# Patient Record
Sex: MALE | Race: WHITE | Employment: OTHER | URBAN - METROPOLITAN AREA
[De-identification: names, ages, dates, MRNs, and addresses within clinical notes are randomized per-mention and may not be internally consistent; named-entity substitution may affect disease eponyms.]

---

## 2020-08-14 NOTE — PROGRESS NOTES
[address] [facility] Oncology at [address] [place] [address]  [phone]    Hematology / Oncology Consult    Reason for Visit:   Lev Gerard is a 81 y.o. male who is seen in consultation at the request of Dr. John Fan for evaluation of lymphoma. History of Present Illness:   Lev Gerard is a 81 y.o. male with AF, h/o CVA comes in for evaluation of abnormal lymphocytes seen in skin biopsy. Patient noted a rash on his left posterior calf and underwent a shave biopsy. He states he had a similar rash on the R calf a year ago treated with Doxycycline. No fevers, chills, unintentional weight loss, sweats, enlarged LNs, n/v/d. His mid back became somewhat more pruritic in the past few days as well. He had a recent ER visit for AF requiring cardioversion, is following with Cardiology closely. No h/o malignancy per patient. Past Medical History:   Diagnosis Date    Atrial fibrillation ([address]     Stroke [facility])       Past Surgical History:   Procedure Laterality Date    HX CERVICAL FUSION      HX HIP REPLACEMENT        Social History     Tobacco Use    Smoking status: Never Smoker    Smokeless tobacco: Never Used   Substance Use Topics    Alcohol use: Yes     Alcohol/week: 1.0 standard drinks     Types: 1 Cans of beer per week      Family History   Problem Relation Age of Onset    Cancer Brother         prostate     Current Outpatient Medications   Medication Sig    atenoloL (TENORMIN) 25 mg tablet TAKE 1 TABLET BY MOUTH EVERY DAY    diclofenac EC (VOLTAREN) 75 mg EC tablet TK 1 T PO  BID WITH FOOD OR MILK    dofetilide (TIKOSYN) 250 mcg capsule TAKE 1 CAPSULE BY MOUTH TWICE A DAY    Xarelto 20 mg tab tablet TK 1 T PO QD    simvastatin (ZOCOR) 40 mg tablet TK 1 T PO QD IN THE DANIEL     No current facility-administered medications for this visit. No Known Allergies     Review of Systems: A complete review of systems was obtained, negative except as described above.     Physical Exam: Visit Vitals  /82 (BP 1 Location: Left arm, BP Patient Position: Sitting)   Pulse 64   Temp 97.8 °F (36.6 °C) (Temporal)   Ht 6' 2\" (1.88 m)   Wt 288 lb 3.2 oz (130.7 kg)   SpO2 94%   BMI 37.00 kg/m²     ECOG PS: 0  General: Well developed, no acute distress  Eyes: PERRLA, EOMI, anicteric sclerae  HENT: Atraumatic, OP clear, TMs intact without erythema  Neck: Supple  Lymphatic: No cervical, supraclavicular, axillary or inguinal adenopathy  Respiratory: CTAB, normal respiratory effort  CV: Normal rate, regular rhythm, no murmurs, no peripheral edema  GI: Soft, nontender, nondistended, no masses, no hepatomegaly, no splenomegaly  MS: Normal gait and station. Digits without clubbing or cyanosis. Skin: No ecchymoses, or petechiae. Normal temperature, turgor, and texture. Left posterior inferior calf with scab and surrounding mild erythema. Mild erythema in mid back. Neuro/Psych: Alert, oriented. 5/5 strength in all 4 extremities. Appropriate affect, normal judgment/insight. Results:     Lab Results   Component Value Date/Time    WBC 8.0 03/31/2009 05:55 AM    HGB 12.6 03/31/2009 05:55 AM    HCT 39.4 03/31/2009 05:55 AM    PLATELET 372 92/86/7298 05:55 AM    MCV 92.7 03/31/2009 05:55 AM    ABS. NEUTROPHILS 4.6 03/31/2009 05:55 AM     Lab Results   Component Value Date/Time    Sodium 136 03/29/2009 06:20 PM    Potassium 4.1 03/29/2009 06:20 PM    Chloride 101 03/29/2009 06:20 PM    CO2 29 03/29/2009 06:20 PM    Glucose 106 03/29/2009 06:20 PM    BUN 12 03/29/2009 06:20 PM    Creatinine 0.9 03/29/2009 06:20 PM    GFR est AA >60 03/29/2009 06:20 PM    GFR est non-AA >60 03/29/2009 06:20 PM    Calcium 8.7 03/29/2009 06:20 PM     Lab Results   Component Value Date/Time    Bilirubin, total 0.6 03/29/2009 06:20 PM    ALT (SGPT) 50 03/29/2009 06:20 PM    Alk.  phosphatase 95 03/29/2009 06:20 PM    Protein, total 7.1 03/29/2009 06:20 PM    Albumin 3.2 (L) 03/29/2009 06:20 PM    Globulin 3.9 03/29/2009 06:20 PM No results found for: IRON, FE, TIBC, IBCT, PSAT, FERR    No results found for: B12LT, FOL, RBCF  No results found for: TSH, TSH2, TSH3, TSHP, TSHEXT  No results found for: HAMAT, HAAB, HABT, HAAT, HBSAG, HBSB, HBSAT, HBABN, HBCM, HBCAB, HBCAT, XBCABS, HBEAB, HBEAG, XHEPCS, [id number], HBEGLT, HBCMLT, HBCLT, HBEBLT, [id number], [id number], [address] [place] [address], [id number], HBCMLT, [id number], HCGAT      Pathology:   Shave biopsy left distal calf 8/11/20: Perivascular infiltrate with eosinophils and atypical CD30+ lymphocytes. Note:  The differential diagnosis includes a lymphomatoid drug eruption and early lymphomatoid papulosis. Marker studies and molecular analysis performed at [address] [place] [address] to better define this process reveal a T-cell infiltrate with prominent loss of CD7 expression and scattered enlarged CD30+ cells, interpreted as worrisome for CD30+ lymphoproliferative disorder. PCR studies reveal only a minor TCR-beta clone. No clonal TCR-gamma gene rearrangement. PAS stain negative for hyphae. Imaging:     Radiology report(s) reviewed. Assessment & Plan:   Sujey Ohara is a 81 y.o. male with AF, h/o CVA here for evaluation of rash. 1. Atypical lymphocytes on skin biopsy / Rash: Discussed pathology and PCR findings in detail with Hematopathologist. The minor TCR clone could be indicative of underlying lymphoproliferative disorder or could be reactive. Will need to obtain additional information from Dermatology regarding how deep this lesion was thought to be. Mycosis fungoides and lymphomatoid paopulosis including cutaneous anaplastic large cell lymphoma would be expected to cause nodular rather than flat lesions. -- Obtain image of rash prior to biopsy from Dermatology. -- Consider repeat and deeper biopsy of surrounding region if appropriate - will discuss with Dermatology. -- CBC, CMP, LDH today  -- CT of ch/abd/pelvis to rule out systemic lymphoproliferative disorder.  Discussed with Dr. Papi Gomez to coordinate and not duplicate studies - however, he is ordering cardiac CT. Therefore, I recommend proceeding with separate ch/abd/pelvis CT. 2. Atrial fibrillation: On Xarelto and Tikosyn. Has recent ER visit at [facility] for this and required cardioversion. Sees Dr. Carlos A Ovalle and Dr. Terra Orr. May try ablation in the near future. 3. H/o CVA: Has some short term memory loss, but no other residual deficits. Emotional well being: Pt is coping well with his/her disease and has excellent support. I appreciate the opportunity to participate in Mr. Jacob Andrews's care.     Signed By: Mahogany Rivas MD     August 18, 2020

## 2020-08-18 ENCOUNTER — TELEPHONE (OUTPATIENT)
Dept: ONCOLOGY | Age: 70
End: 2020-08-18

## 2020-08-18 ENCOUNTER — OFFICE VISIT (OUTPATIENT)
Dept: ONCOLOGY | Age: 70
End: 2020-08-18
Payer: MEDICARE

## 2020-08-18 VITALS
WEIGHT: 288.2 LBS | DIASTOLIC BLOOD PRESSURE: 82 MMHG | HEIGHT: 74 IN | BODY MASS INDEX: 36.99 KG/M2 | SYSTOLIC BLOOD PRESSURE: 136 MMHG | HEART RATE: 64 BPM | OXYGEN SATURATION: 94 % | TEMPERATURE: 97.8 F

## 2020-08-18 DIAGNOSIS — I48.0 PAROXYSMAL ATRIAL FIBRILLATION (HCC): ICD-10-CM

## 2020-08-18 DIAGNOSIS — Z86.73 H/O: CVA (CEREBROVASCULAR ACCIDENT): ICD-10-CM

## 2020-08-18 DIAGNOSIS — D47.9 LYMPHOPROLIFERATIVE DISORDER (HCC): ICD-10-CM

## 2020-08-18 DIAGNOSIS — D47.9 LYMPHOPROLIFERATIVE DISORDER (HCC): Primary | ICD-10-CM

## 2020-08-18 LAB
BASOPHILS # BLD: 0 K/UL (ref 0–0.1)
BASOPHILS NFR BLD: 0 % (ref 0–1)
DIFFERENTIAL METHOD BLD: NORMAL
EOSINOPHIL # BLD: 0.2 K/UL (ref 0–0.4)
EOSINOPHIL NFR BLD: 2 % (ref 0–7)
ERYTHROCYTE [DISTWIDTH] IN BLOOD BY AUTOMATED COUNT: 13.1 % (ref 11.5–14.5)
HCT VFR BLD AUTO: 44.2 % (ref 36.6–50.3)
HGB BLD-MCNC: 13.9 G/DL (ref 12.1–17)
IMM GRANULOCYTES # BLD AUTO: 0 K/UL (ref 0–0.04)
IMM GRANULOCYTES NFR BLD AUTO: 0 % (ref 0–0.5)
LYMPHOCYTES # BLD: 1.7 K/UL (ref 0.8–3.5)
LYMPHOCYTES NFR BLD: 16 % (ref 12–49)
MCH RBC QN AUTO: 29.5 PG (ref 26–34)
MCHC RBC AUTO-ENTMCNC: 31.4 G/DL (ref 30–36.5)
MCV RBC AUTO: 93.8 FL (ref 80–99)
MONOCYTES # BLD: 0.7 K/UL (ref 0–1)
MONOCYTES NFR BLD: 7 % (ref 5–13)
NEUTS SEG # BLD: 8 K/UL (ref 1.8–8)
NEUTS SEG NFR BLD: 75 % (ref 32–75)
NRBC # BLD: 0 K/UL (ref 0–0.01)
NRBC BLD-RTO: 0 PER 100 WBC
PLATELET # BLD AUTO: 257 K/UL (ref 150–400)
PMV BLD AUTO: 10.9 FL (ref 8.9–12.9)
RBC # BLD AUTO: 4.71 M/UL (ref 4.1–5.7)
WBC # BLD AUTO: 10.7 K/UL (ref 4.1–11.1)

## 2020-08-18 PROCEDURE — 99204 OFFICE O/P NEW MOD 45 MIN: CPT | Performed by: INTERNAL MEDICINE

## 2020-08-18 RX ORDER — RIVAROXABAN 20 MG/1
TABLET, FILM COATED ORAL
COMMUNITY
Start: 2020-08-08 | End: 2021-03-03

## 2020-08-18 RX ORDER — DOFETILIDE 0.25 MG/1
CAPSULE ORAL
COMMUNITY
Start: 2020-08-01

## 2020-08-18 RX ORDER — DICLOFENAC SODIUM 75 MG/1
TABLET, DELAYED RELEASE ORAL
COMMUNITY
Start: 2020-08-08

## 2020-08-18 RX ORDER — ATENOLOL 25 MG/1
TABLET ORAL
COMMUNITY
Start: 2020-07-06

## 2020-08-18 RX ORDER — SIMVASTATIN 40 MG/1
TABLET, FILM COATED ORAL
COMMUNITY
Start: 2020-08-10

## 2020-08-18 NOTE — TELEPHONE ENCOUNTER
[address] at [facility] [address]  [phone]        08/18/20 4:25 PM Attempted to call patient via cell phone number listed. No answer, left message requesting return call. Provided office phone number as well. Dr. Yoko Gordillo spoke with Dr. Mj Srivastava to discuss her recommendation for CT of ch/abd/pelvis. Dr. Bren Peguero stated that the CT he is ordering is a very specific cardiac CT, which is not the same as what Dr. Yoko Gordillo needs. Dr. Yoko Gordillo has placed order for these scans to be done at [facility]. Patient should also drink plenty of fluids the night before the scan, as well as immediately after the CT scan. Will continue to monitor. 08/19/20 10:36 AM Patient returned call. Informed of above. He verbalized understanding. No further questions or concerns at this time.

## 2020-08-18 NOTE — PROGRESS NOTES
Ej Santiago is a 81 y.o. male here for evaluation of lymphoma. Patient with no complaints of pain at this time.

## 2020-08-19 DIAGNOSIS — D47.9 LYMPHOPROLIFERATIVE DISORDER (HCC): ICD-10-CM

## 2020-08-19 DIAGNOSIS — D47.9 LYMPHOPROLIFERATIVE DISORDER (HCC): Primary | ICD-10-CM

## 2020-08-19 NOTE — PROGRESS NOTES
08/19/20 2:18 PM - lab called and stated CMP and LDH hemolyzed - advised I will put in new orders. Asked lab to call patient to have labs re-drawn.

## 2020-08-20 LAB
ALBUMIN SERPL-MCNC: 3.9 G/DL (ref 3.5–5)
ALBUMIN/GLOB SERPL: 1.2 {RATIO} (ref 1.1–2.2)
ALP SERPL-CCNC: 72 U/L (ref 45–117)
ALT SERPL-CCNC: 46 U/L (ref 12–78)
ANION GAP SERPL CALC-SCNC: 8 MMOL/L (ref 5–15)
AST SERPL-CCNC: 22 U/L (ref 15–37)
BILIRUB SERPL-MCNC: 0.5 MG/DL (ref 0.2–1)
BUN SERPL-MCNC: 23 MG/DL (ref 6–20)
BUN/CREAT SERPL: 24 (ref 12–20)
CALCIUM SERPL-MCNC: 9 MG/DL (ref 8.5–10.1)
CHLORIDE SERPL-SCNC: 108 MMOL/L (ref 97–108)
CO2 SERPL-SCNC: 24 MMOL/L (ref 21–32)
COMMENT, HOLDF: NORMAL
CREAT SERPL-MCNC: 0.95 MG/DL (ref 0.7–1.3)
GLOBULIN SER CALC-MCNC: 3.3 G/DL (ref 2–4)
GLUCOSE SERPL-MCNC: 97 MG/DL (ref 65–100)
LDH SERPL L TO P-CCNC: 242 U/L (ref 85–241)
POTASSIUM SERPL-SCNC: 4.3 MMOL/L (ref 3.5–5.1)
PROT SERPL-MCNC: 7.2 G/DL (ref 6.4–8.2)
SAMPLES BEING HELD,HOLD: NORMAL
SODIUM SERPL-SCNC: 140 MMOL/L (ref 136–145)

## 2020-09-08 ENCOUNTER — HOSPITAL ENCOUNTER (OUTPATIENT)
Dept: CT IMAGING | Age: 70
Discharge: HOME | End: 2020-09-08
Attending: INTERNAL MEDICINE
Payer: MEDICARE

## 2020-09-08 DIAGNOSIS — D47.9 LYMPHOPROLIFERATIVE DISORDER (HCC): ICD-10-CM

## 2020-09-08 PROCEDURE — 74011000636 HC RX REV CODE- 636: Performed by: INTERNAL MEDICINE

## 2020-09-08 PROCEDURE — 74177 CT ABD & PELVIS W/CONTRAST: CPT

## 2020-09-08 RX ADMIN — IOPAMIDOL 100 ML: 755 INJECTION, SOLUTION INTRAVENOUS at 08:22

## 2020-09-15 ENCOUNTER — OFFICE VISIT (OUTPATIENT)
Dept: ONCOLOGY | Age: 70
End: 2020-09-15
Payer: MEDICARE

## 2020-09-15 VITALS
BODY MASS INDEX: 36.91 KG/M2 | DIASTOLIC BLOOD PRESSURE: 90 MMHG | OXYGEN SATURATION: 98 % | WEIGHT: 287.6 LBS | HEIGHT: 74 IN | HEART RATE: 68 BPM | TEMPERATURE: 97.3 F | SYSTOLIC BLOOD PRESSURE: 158 MMHG

## 2020-09-15 DIAGNOSIS — I48.91 ATRIAL FIBRILLATION, UNSPECIFIED TYPE (HCC): ICD-10-CM

## 2020-09-15 DIAGNOSIS — R21 RASH: Primary | ICD-10-CM

## 2020-09-15 DIAGNOSIS — Z86.73 H/O: CVA (CEREBROVASCULAR ACCIDENT): ICD-10-CM

## 2020-09-15 DIAGNOSIS — K57.30 SIGMOID DIVERTICULOSIS: ICD-10-CM

## 2020-09-15 PROCEDURE — G8419 CALC BMI OUT NRM PARAM NOF/U: HCPCS | Performed by: INTERNAL MEDICINE

## 2020-09-15 PROCEDURE — G8536 NO DOC ELDER MAL SCRN: HCPCS | Performed by: INTERNAL MEDICINE

## 2020-09-15 PROCEDURE — 3017F COLORECTAL CA SCREEN DOC REV: CPT | Performed by: INTERNAL MEDICINE

## 2020-09-15 PROCEDURE — G8427 DOCREV CUR MEDS BY ELIG CLIN: HCPCS | Performed by: INTERNAL MEDICINE

## 2020-09-15 PROCEDURE — 1101F PT FALLS ASSESS-DOCD LE1/YR: CPT | Performed by: INTERNAL MEDICINE

## 2020-09-15 PROCEDURE — G8432 DEP SCR NOT DOC, RNG: HCPCS | Performed by: INTERNAL MEDICINE

## 2020-09-15 PROCEDURE — 99214 OFFICE O/P EST MOD 30 MIN: CPT | Performed by: INTERNAL MEDICINE

## 2020-09-15 RX ORDER — PANTOPRAZOLE SODIUM 40 MG/1
TABLET, DELAYED RELEASE ORAL
COMMUNITY
Start: 2020-09-10 | End: 2021-02-23

## 2020-09-15 RX ORDER — SUCRALFATE 1 G/1
TABLET ORAL
COMMUNITY
Start: 2020-09-10 | End: 2021-02-23

## 2020-09-15 RX ORDER — SPIRONOLACTONE 25 MG/1
25 TABLET ORAL DAILY
COMMUNITY
Start: 2020-09-10

## 2020-09-15 NOTE — PROGRESS NOTES
[address] [facility] Oncology at [facility]  [phone]    Hematology / Oncology Established Visit    Reason for Visit:   Ej Santiago is a 78 y.o. male who is seen for f/u of lymphoma. Initially referred by Dr. Chaim Perez. History of Present Illness:   Ej Santiago is a 78 y.o. male with AF, h/o CVA comes in for evaluation of abnormal lymphocytes seen in skin biopsy. Patient noted a rash on his left posterior calf and underwent a shave biopsy. He states he had a similar rash on the R calf a year ago treated with Doxycycline. No fevers, chills, unintentional weight loss, sweats, enlarged LNs, n/v/d. His mid back became somewhat more pruritic in the past few days as well. He had a recent ER visit for AF requiring cardioversion, is following with Cardiology closely. No h/o malignancy per patient. Interval History: Pt recently underwent ablation for A-fib. Has been having trouble with constipation. Is applying a steroid ointment daily to his left ankle site of prior lesion. Past Medical History:   Diagnosis Date    Atrial fibrillation ([address]     Stroke [facility])       Past Surgical History:   Procedure Laterality Date    HX CERVICAL FUSION      HX HIP REPLACEMENT        Social History     Tobacco Use    Smoking status: Never Smoker    Smokeless tobacco: Never Used   Substance Use Topics    Alcohol use:  Yes     Alcohol/week: 1.0 standard drinks     Types: 1 Cans of beer per week      Family History   Problem Relation Age of Onset    Cancer Brother         prostate     Current Outpatient Medications   Medication Sig    atenoloL (TENORMIN) 25 mg tablet TAKE 1 TABLET BY MOUTH EVERY DAY    diclofenac EC (VOLTAREN) 75 mg EC tablet TK 1 T PO  BID WITH FOOD OR MILK    dofetilide (TIKOSYN) 250 mcg capsule TAKE 1 CAPSULE BY MOUTH TWICE A DAY    Xarelto 20 mg tab tablet TK 1 T PO QD    simvastatin (ZOCOR) 40 mg tablet TK 1 T PO QD IN THE DANIEL     No current facility-administered medications for this visit. No Known Allergies     Review of Systems: A complete review of systems was obtained, negative except as described above. Physical Exam:     Visit Vitals  BP (!) 158/90 (BP 1 Location: Left arm, BP Patient Position: Sitting)   Pulse 68   Temp 97.3 °F (36.3 °C) (Temporal)   Ht 6' 2\" (1.88 m)   Wt 287 lb 9.6 oz (130.5 kg)   SpO2 98%   BMI 36.93 kg/m²     ECOG PS: 0  General: Well developed, no acute distress  Eyes: PERRLA, EOMI, anicteric sclerae  HENT: Atraumatic, OP clear, TMs intact without erythema  Neck: Supple  Lymphatic: No cervical, supraclavicular, axillary or inguinal adenopathy  Respiratory: CTAB, normal respiratory effort  CV: Normal rate, regular rhythm, no murmurs, no peripheral edema  GI: Soft, nontender, nondistended, no masses, no hepatomegaly, no splenomegaly  MS: Normal gait and station. Digits without clubbing or cyanosis. Skin: No ecchymoses, or petechiae. Normal temperature, turgor, and texture. Left posterior inferior calf with scab and surrounding mild erythema. Mild erythema in mid back. Neuro/Psych: Alert, oriented. 5/5 strength in all 4 extremities. Appropriate affect, normal judgment/insight. Results:     Lab Results   Component Value Date/Time    WBC 10.7 08/18/2020 03:42 PM    HGB 13.9 08/18/2020 03:42 PM    HCT 44.2 08/18/2020 03:42 PM    PLATELET 248 24/41/3624 03:42 PM    MCV 93.8 08/18/2020 03:42 PM    ABS.  NEUTROPHILS 8.0 08/18/2020 03:42 PM     Lab Results   Component Value Date/Time    Sodium 140 08/19/2020 04:40 PM    Potassium 4.3 08/19/2020 04:40 PM    Chloride 108 08/19/2020 04:40 PM    CO2 24 08/19/2020 04:40 PM    Glucose 97 08/19/2020 04:40 PM    BUN 23 (H) 08/19/2020 04:40 PM    Creatinine 0.95 08/19/2020 04:40 PM    GFR est AA >60 08/19/2020 04:40 PM    GFR est non-AA >60 08/19/2020 04:40 PM    Calcium 9.0 08/19/2020 04:40 PM     Lab Results   Component Value Date/Time    Bilirubin, total 0.5 08/19/2020 04:40 PM    ALT (SGPT) 46 08/19/2020 04:40 PM    Alk. phosphatase 72 08/19/2020 04:40 PM    Protein, total 7.2 08/19/2020 04:40 PM    Albumin 3.9 08/19/2020 04:40 PM    Globulin 3.3 08/19/2020 04:40 PM     No results found for: IRON, FE, TIBC, IBCT, PSAT, FERR    No results found for: B12LT, FOL, RBCF  No results found for: TSH, TSH2, TSH3, TSHP, TSHEXT, TSHEXT  No results found for: HAMAT, HAAB, HABT, HAAT, HBSAG, HBSB, HBSAT, HBABN, HBCM, HBCAB, HBCAT, XBCABS, HBEAB, HBEAG, XHEPCS, [id number], HBEGLT, HBCMLT, HBCLT, [address], [id number], [id number], [facility], [id number], HBCMLT, [id number], HCGAT      Pathology:   Shave biopsy left distal calf 8/11/20: Perivascular infiltrate with eosinophils and atypical CD30+ lymphocytes. Note:  The differential diagnosis includes a lymphomatoid drug eruption and early lymphomatoid papulosis. Marker studies and molecular analysis performed at [address] [place] [address] to better define this process reveal a T-cell infiltrate with prominent loss of CD7 expression and scattered enlarged CD30+ cells, interpreted as worrisome for CD30+ lymphoproliferative disorder. PCR studies reveal only a minor TCR-beta clone. No clonal TCR-gamma gene rearrangement. PAS stain negative for hyphae. Imaging:     CT ch/abd/pelvis 9/8/20: IMPRESSION:  1. No lymphadenopathy in the chest, abdomen, or pelvis. 2.  No acute pathology visualized. 3.  Incidental sigmoid diverticulosis and small hiatal hernia. Assessment & Plan:   Kristi Hernandez is a 78 y.o. male with AF, h/o CVA here for evaluation of rash. 1. Rash on Left ankle: Atypical lymphocytes on skin biopsy of Rash. Discussed pathology and PCR findings in detail with Hematopathologist. The minor TCR clone could be indicative of underlying lymphoproliferative disorder or could be reactive. No evidence of lymphadenopathy on exam or body CT imaging. As the rash lesion has largely improved, rebiopsy may not be as useful at this time.  I discussed with Dr. Adonica Lank that rebiopsy may be needed in the future if lesion returns. He agrees. Mycosis fungoides and lymphomatoid paopulosis including cutaneous anaplastic large cell lymphoma would be expected to cause nodular rather than flat lesions. -- No further testing at this time. -- Follow up with Dermatology. Can consider repeat deeper biopsy if lesion recurs in the future. 2. Atrial fibrillation: On Xarelto and Tikosyn. Had cardiac ablation 9/2020. Sees Dr. Lauren Kent and Dr. Braden Betts. 3. H/o CVA: Has some short term memory loss, but no other residual deficits. 4. Sigmoid diverticulosis: Has recently having problems with constipation. Is scheduled for colonoscopy soon. Emotional well being: Pt is coping well with his/her disease and has excellent support. I appreciate the opportunity to participate in Mr. Mariana Andrews's care.     Signed By: Shikha Gomez MD     September 15, 2020

## 2020-09-15 NOTE — PROGRESS NOTES
Normajean Cowden is a 78 y.o. male here for follow up of lymphoma. Patient with no complaints of pain at this time.

## 2021-02-23 ENCOUNTER — HOSPITAL ENCOUNTER (OUTPATIENT)
Dept: PREADMISSION TESTING | Age: 71
Discharge: HOME | End: 2021-02-23
Payer: MEDICARE

## 2021-02-23 VITALS
TEMPERATURE: 97.5 F | DIASTOLIC BLOOD PRESSURE: 82 MMHG | OXYGEN SATURATION: 95 % | RESPIRATION RATE: 20 BRPM | HEART RATE: 66 BPM | BODY MASS INDEX: 37.93 KG/M2 | SYSTOLIC BLOOD PRESSURE: 163 MMHG | HEIGHT: 73 IN | WEIGHT: 286.16 LBS

## 2021-02-23 LAB
ANION GAP SERPL CALC-SCNC: 6 MMOL/L (ref 5–15)
BUN SERPL-MCNC: 19 MG/DL (ref 6–20)
BUN/CREAT SERPL: 22 (ref 12–20)
CALCIUM SERPL-MCNC: 8.6 MG/DL (ref 8.5–10.1)
CHLORIDE SERPL-SCNC: 106 MMOL/L (ref 97–108)
CO2 SERPL-SCNC: 25 MMOL/L (ref 21–32)
CREAT SERPL-MCNC: 0.87 MG/DL (ref 0.7–1.3)
GLUCOSE SERPL-MCNC: 86 MG/DL (ref 65–100)
POTASSIUM SERPL-SCNC: 4.3 MMOL/L (ref 3.5–5.1)
SODIUM SERPL-SCNC: 137 MMOL/L (ref 136–145)

## 2021-02-23 PROCEDURE — 93005 ELECTROCARDIOGRAM TRACING: CPT

## 2021-02-23 PROCEDURE — 36415 COLL VENOUS BLD VENIPUNCTURE: CPT

## 2021-02-23 PROCEDURE — 80048 BASIC METABOLIC PNL TOTAL CA: CPT

## 2021-02-23 RX ORDER — AMLODIPINE BESYLATE 2.5 MG/1
2.5 TABLET ORAL DAILY
COMMUNITY

## 2021-02-23 NOTE — H&P
History and Physical    Patient: South Gómez MRN: [id number]  SSN: [id number]    YOB: 1950  Age: 78 y.o. Sex: male      Subjective:      South Gómez is a 78 y.o. male who is going to undergo excision of rhinophyma. He notes now that we have been wearing a mask for COVID he notes his nose has been more painful. He is followed by Dr. Faina Hlil for AFIB/stroke and is on Xarelto. Past Medical History:   Diagnosis Date    Anticoagulated     Xarelto    Atrial fibrillation ([facility] 2018    BCC (basal cell carcinoma of skin)     Claustrophobia     HTN (hypertension)     MELO on CPAP     Short-term memory loss     Stroke [facility])     Post AFIB Ablation     Past Surgical History:   Procedure Laterality Date    HX AFIB ABLATION  09/2020    HX CERVICAL FUSION      x 2    HX HIP REPLACEMENT Left     HX KNEE ARTHROSCOPY Left       Family History   Problem Relation Age of Onset    Cancer Brother         prostate    Anesth Problems Neg Hx     Clotting Disorder Neg Hx      Social History     Tobacco Use    Smoking status: Never Smoker    Smokeless tobacco: Never Used   Substance Use Topics    Alcohol use: Yes     Alcohol/week: 1.0 standard drinks     Types: 1 Cans of beer per week      Prior to Admission medications    Medication Sig Start Date End Date Taking? Authorizing Provider   amLODIPine (NORVASC) 2.5 mg tablet Take 2.5 mg by mouth daily. Yes Provider, Historical   spironolactone (ALDACTONE) 25 mg tablet Take 25 mg by mouth daily.  9/10/20  Yes Provider, Historical   atenoloL (TENORMIN) 25 mg tablet TAKE 1 TABLET BY MOUTH EVERY DAY 7/6/20  Yes Provider, Historical   diclofenac EC (VOLTAREN) 75 mg EC tablet TK 1 T PO  BID WITH FOOD OR MILK 8/8/20  Yes Provider, Historical   Xarelto 20 mg tab tablet TK 1 T PO QD 8/8/20  Yes Provider, Historical   simvastatin (ZOCOR) 40 mg tablet TK 1 T PO QD IN THE DANIEL 8/10/20  Yes Provider, Historical   dofetilide (TIKOSYN) 250 mcg capsule TAKE 1 CAPSULE BY MOUTH TWICE A DAY 8/1/20   Provider, Historical        No Known Allergies    Review of Systems:  Constitutional: Negative for chills and fever  HENT: Negative for congestion and sore throat  Eyes: negative for blurred vision and double vision  Respiratory: Negative for cough, shortness of breath and wheezing  Mouth: Negative for loose, broken or chipped teeth. Cardiovascular: Negative for chest pain and palpitations  Gastrointestinal: Negative for abdominal pain, constipation, diarrhea and nausea  Genitourinary: Negative for dysuria and hematuria  Musculoskeletal: Negative for joint pain  Skin: Negative for rash, open wounds. Negative for bruises easily  Neurological: Negative for dizziness, tremors and headaches  Psychiatric: Negative for depression. The patient is not nervous/anxious. Objective:     Vitals:    02/23/21 1513   BP: (!) 163/82   Pulse: 66   Resp: 20   Temp: 97.5 °F (36.4 °C)   SpO2: 95%   Weight: 129.8 kg (286 lb 2.5 oz)   Height: 6' 1\" (1.854 m)        Physical Exam:  Constitutional:  Appears well,  No Acute Distress, Vitals noted  Psychiatric:   Affect normal, Alert and Oriented to person/place/time    Eyes:   Pupils equally round and reactive, EOMI, conjunctiva clear, eyelids normal  ENT:   External ears and nose normal, teeth normal, gums normal, TMs and Orophyarynx normal  Neck:   General inspection and Thyroid normal.  No abnormal cervical or supraclavicular nodes    Lungs:   Clear to auscultation, good respiratory effort  Heart: Ausculation normal.  Regular rhythm. No cardiac murmurs. No carotid bruits or palpable thrills  Chest wall normal  Musculoskeletal: Normal gait  Extremities:   Without edema, good peripheral pulses  Skin:   Warm to palpation, without rashes, bruising, or suspicious lesions.  Scabs to both hands    Recent Results (from the past 72 hour(s))   METABOLIC PANEL, BASIC    Collection Time: 02/23/21  3:52 PM   Result Value Ref Range    Sodium 137 136 - 145 mmol/L    Potassium 4.3 3.5 - 5.1 mmol/L    Chloride 106 97 - 108 mmol/L    CO2 25 21 - 32 mmol/L    Anion gap 6 5 - 15 mmol/L    Glucose 86 65 - 100 mg/dL    BUN 19 6 - 20 MG/DL    Creatinine 0.87 0.70 - 1.30 MG/DL    BUN/Creatinine ratio 22 (H) 12 - 20      GFR est AA >60 >60 ml/min/1.73m2    GFR est non-AA >60 >60 ml/min/1.73m2    Calcium 8.6 8.5 - 10.1 MG/DL   EKG, 12 LEAD, INITIAL    Collection Time: 02/23/21  5:26 PM   Result Value Ref Range    Ventricular Rate 61 BPM    Atrial Rate 61 BPM    P-R Interval 142 ms    QRS Duration 82 ms    Q-T Interval 450 ms    QTC Calculation (Bezet) 453 ms    Calculated P Axis 8 degrees    Calculated R Axis 54 degrees    Calculated T Axis 63 degrees    Diagnosis       Normal sinus rhythm  Septal infarct , age undetermined  Abnormal ECG  When compared with ECG of 29-MAR-2009 23:32,  Septal infarct is now present  ST less elevated in Inferior leads  T wave inversion no longer evident in Inferior leads           Assessment:     Rhinophyma    Plan:     Excision of rhinophyma    BMP and EKG reviewed    Signed By: Mindi Mcelroy NP     February 24, 2021

## 2021-02-23 NOTE — PERIOP NOTES
[id number] [facility], [id number] [place] [address] OR                                  [phone]   MAIN PRE OP                          [phone]                                                                                AMBULATORY PRE OP          [phone]  PRE-ADMISSION TESTING    [phone]   Surgery Date: Wednesday 3/3/21      Is surgery arrival time given by surgeon? NO  If Rekha Rascon staff will call you  Tuesday 3/2/21 between 3 and 7pm the day before your surgery with your arrival time. (If your surgery is on a Monday, we will call you the Friday before.)    Call [phone] after 7pm Monday-Friday if you did not receive this call. INSTRUCTIONS BEFORE YOUR SURGERY   When You  Arrive Arrive at the [address] [facility] on the day of your surgery  Have your insurance card, photo ID, and any copayment (if needed)   Food   and   Drink NO food or drink after midnight the night before surgery    This means NO water, gum, mints, coffee, juice, etc.  No alcohol (beer, wine, liquor) 24 hours before and after surgery   Medications to   TAKE   Morning of Surgery MEDICATIONS TO TAKE THE MORNING OF SURGERY WITH A SIP OF WATER:      Dofetilide     Medications  To  STOP      7 days before surgery  Non-Steroidal anti-inflammatory Drugs (NSAID's): for example, Ibuprofen (Advil, Motrin), Naproxen (Aleve)   Aspirin, if taking for pain    Herbal supplements, vitamins, and fish oil     Blood  Thinners  If you take  Aspirin, Plavix, Coumadin, or any blood-thinning or anti-blood clot medicine, talk to the doctor who prescribed the medications for pre-operative instructions.    Bathing Clothing  Jewelry  Valuables      MAY shower the morning of surgery, please do not apply anything to your skin (lotions, powders, deodorant, or makeup, especially mascara)   Do not shave or trim anywhere 24 hours before surgery   Wear your hair loose or down; no pony-tails, buns, or metal hair clips   Wear loose, comfortable, clean clothes   Wear glasses instead of contacts  [address], valuables, and jewelry, including body piercings, at home   Going Home - or Spending the Night  SAME-DAY SURGERY: You must have a responsible adult drive you home and stay with you 24 hours after surgery   ADMITS: If your doctor is keeping you in the hospital after surgery, leave personal belongings/luggage in your car until you have a hospital room number. Hospital discharge time is 12 noon  Drivers must be here before 12 noon unless you are told differently   Special Instructions It is now mandated that all surgical patients be tested for COVID-19 prior to surgery. Testing has to be exactly 4 days prior to surgery. Your COVID test date is Saturday 2/27/21 between 8:00 am and 11:00 am.       COVID testing will be performed curbside at the [facility] [address]. There will be signs leading you to the testing site. You will need to bring a photo ID with you to be swabbed. Patients are advised to self-quarantine at home after testing and prior to your surgery date. You will be notified if your results are positive. What to watch for:   Coronavirus (COVID-19) affects different people in different ways   It also appears with a wide range of symptoms from mild to severe   Signs usually appear 2-14 days after exposure     If you develop any of the following, notify your doctor immediately:  o Fever  o Chills, with or without a shiver  o Muscle pain  o Headache  o Sore throat  o Dry cough  o New loss of taste or smell  o Tiredness      If you develop any of the following, call 911:  o Shortness of breath  o Difficulty breathing  o Chest pain  o New confusion  o Blueness of fingers and/or lips     Follow all instructions so your surgery wont be cancelled. Please, be on time.                     If a situation occurs and you are delayed the day of surgery, call [phone] or [phone]. If your physical condition changes (like a fever, cold, flu, etc.) call your surgeon. Home medication(s) reviewed and verified via     LIST   VERBAL   during PAT appointment. The patient was contacted by      IN-PERSON  The patient verbalizes understanding of all instructions and      DOES NOT   need reinforcement.

## 2021-02-24 LAB
ATRIAL RATE: 61 BPM
CALCULATED P AXIS, ECG09: 13 DEGREES
CALCULATED R AXIS, ECG10: 54 DEGREES
CALCULATED T AXIS, ECG11: 61 DEGREES
DIAGNOSIS, 93000: NORMAL
P-R INTERVAL, ECG05: 146 MS
Q-T INTERVAL, ECG07: 456 MS
QRS DURATION, ECG06: 84 MS
QTC CALCULATION (BEZET), ECG08: 459 MS
VENTRICULAR RATE, ECG03: 61 BPM

## 2021-02-25 NOTE — PERIOP NOTES
SAMMIE at Dr. Clover Ring office requesting return of Xaneena plan. Zaida, from Dr. Clover Ring office called. They need to see patient prior to giving [facility] recomendations for surgery. I called patient and requested he call Dr. Clover Ring office per their request to be seen for [facility] plan recommendations. Patient agreed.  Given phone number and Zaida's name to request.

## 2021-02-27 ENCOUNTER — TRANSCRIBE ORDER (OUTPATIENT)
Dept: REGISTRATION | Age: 71
End: 2021-02-27

## 2021-02-27 ENCOUNTER — HOSPITAL ENCOUNTER (OUTPATIENT)
Dept: PREADMISSION TESTING | Age: 71
Discharge: HOME | End: 2021-02-27
Payer: MEDICARE

## 2021-02-27 DIAGNOSIS — Z01.812 ENCOUNTER FOR PREOPERATIVE SCREENING LABORATORY TESTING FOR COVID-19 VIRUS: Primary | ICD-10-CM

## 2021-02-27 DIAGNOSIS — Z20.822 ENCOUNTER FOR PREOPERATIVE SCREENING LABORATORY TESTING FOR COVID-19 VIRUS: Primary | ICD-10-CM

## 2021-02-27 PROCEDURE — U0005 INFEC AGEN DETEC AMPLI PROBE: HCPCS

## 2021-02-28 LAB — SARS-COV-2, COV2NT: NOT DETECTED

## 2021-03-01 NOTE — PERIOP NOTES
Called patient. He was instructed by Dr. Ileana Sol to stop Xarelto 2 days prior to surgery. PAT has not received fax from Dr. Ileana Sol office yet.

## 2021-03-02 ENCOUNTER — ANESTHESIA EVENT (OUTPATIENT)
Dept: SURGERY | Age: 71
End: 2021-03-02
Payer: MEDICARE

## 2021-03-03 ENCOUNTER — HOSPITAL ENCOUNTER (OUTPATIENT)
Age: 71
Setting detail: OUTPATIENT SURGERY
Discharge: HOME | End: 2021-03-03
Attending: OTOLARYNGOLOGY | Admitting: OTOLARYNGOLOGY
Payer: MEDICARE

## 2021-03-03 ENCOUNTER — ANESTHESIA (OUTPATIENT)
Dept: SURGERY | Age: 71
End: 2021-03-03
Payer: MEDICARE

## 2021-03-03 VITALS
DIASTOLIC BLOOD PRESSURE: 72 MMHG | HEIGHT: 73 IN | TEMPERATURE: 98 F | WEIGHT: 286.16 LBS | OXYGEN SATURATION: 98 % | SYSTOLIC BLOOD PRESSURE: 137 MMHG | RESPIRATION RATE: 13 BRPM | BODY MASS INDEX: 37.93 KG/M2 | HEART RATE: 72 BPM

## 2021-03-03 DIAGNOSIS — L71.1 RHINOPHYMA: Primary | ICD-10-CM

## 2021-03-03 PROCEDURE — 77030026438 HC STYL ET INTUB CARD -A: Performed by: NURSE ANESTHETIST, CERTIFIED REGISTERED

## 2021-03-03 PROCEDURE — 74011000250 HC RX REV CODE- 250: Performed by: NURSE ANESTHETIST, CERTIFIED REGISTERED

## 2021-03-03 PROCEDURE — 74011000250 HC RX REV CODE- 250: Performed by: OTOLARYNGOLOGY

## 2021-03-03 PROCEDURE — 74011250636 HC RX REV CODE- 250/636: Performed by: OTOLARYNGOLOGY

## 2021-03-03 PROCEDURE — 74011250637 HC RX REV CODE- 250/637: Performed by: OTOLARYNGOLOGY

## 2021-03-03 PROCEDURE — 76210000016 HC OR PH I REC 1 TO 1.5 HR: Performed by: OTOLARYNGOLOGY

## 2021-03-03 PROCEDURE — 88305 TISSUE EXAM BY PATHOLOGIST: CPT

## 2021-03-03 PROCEDURE — 76210000020 HC REC RM PH II FIRST 0.5 HR: Performed by: OTOLARYNGOLOGY

## 2021-03-03 PROCEDURE — 74011250636 HC RX REV CODE- 250/636: Performed by: ANESTHESIOLOGY

## 2021-03-03 PROCEDURE — 74011000272 HC RX REV CODE- 272: Performed by: OTOLARYNGOLOGY

## 2021-03-03 PROCEDURE — 77030006907 HC BLD SNUS SHV MEDT -C: Performed by: OTOLARYNGOLOGY

## 2021-03-03 PROCEDURE — 76010000153 HC OR TIME 1.5 TO 2 HR: Performed by: OTOLARYNGOLOGY

## 2021-03-03 PROCEDURE — 77030018836 HC SOL IRR NACL ICUM -A: Performed by: OTOLARYNGOLOGY

## 2021-03-03 PROCEDURE — 74011250636 HC RX REV CODE- 250/636: Performed by: NURSE ANESTHETIST, CERTIFIED REGISTERED

## 2021-03-03 PROCEDURE — 2709999900 HC NON-CHARGEABLE SUPPLY: Performed by: OTOLARYNGOLOGY

## 2021-03-03 PROCEDURE — 77030040356 HC CORD BPLR FRCP COVD -A: Performed by: OTOLARYNGOLOGY

## 2021-03-03 PROCEDURE — 76060000034 HC ANESTHESIA 1.5 TO 2 HR: Performed by: OTOLARYNGOLOGY

## 2021-03-03 PROCEDURE — 77030008684 HC TU ET CUF COVD -B: Performed by: NURSE ANESTHETIST, CERTIFIED REGISTERED

## 2021-03-03 RX ORDER — CEFDINIR 300 MG/1
600 CAPSULE ORAL DAILY
Qty: 14 CAP | Refills: 0 | Status: SHIPPED | OUTPATIENT
Start: 2021-03-03 | End: 2021-03-10

## 2021-03-03 RX ORDER — SCOLOPAMINE TRANSDERMAL SYSTEM 1 MG/1
1 PATCH, EXTENDED RELEASE TRANSDERMAL ONCE
Status: DISCONTINUED | OUTPATIENT
Start: 2021-03-03 | End: 2021-03-03 | Stop reason: HOSPADM

## 2021-03-03 RX ORDER — HYDROCODONE BITARTRATE AND ACETAMINOPHEN 5; 325 MG/1; MG/1
1 TABLET ORAL
Qty: 10 TAB | Refills: 0 | Status: SHIPPED | OUTPATIENT
Start: 2021-03-03 | End: 2021-03-06

## 2021-03-03 RX ORDER — LIDOCAINE HYDROCHLORIDE 10 MG/ML
0.1 INJECTION, SOLUTION EPIDURAL; INFILTRATION; INTRACAUDAL; PERINEURAL AS NEEDED
Status: DISCONTINUED | OUTPATIENT
Start: 2021-03-03 | End: 2021-03-03 | Stop reason: HOSPADM

## 2021-03-03 RX ORDER — ROCURONIUM BROMIDE 10 MG/ML
INJECTION, SOLUTION INTRAVENOUS AS NEEDED
Status: DISCONTINUED | OUTPATIENT
Start: 2021-03-03 | End: 2021-03-03 | Stop reason: HOSPADM

## 2021-03-03 RX ORDER — PROPOFOL 10 MG/ML
INJECTION, EMULSION INTRAVENOUS
Status: DISCONTINUED | OUTPATIENT
Start: 2021-03-03 | End: 2021-03-03 | Stop reason: HOSPADM

## 2021-03-03 RX ORDER — EPHEDRINE SULFATE/0.9% NACL/PF 50 MG/5 ML
SYRINGE (ML) INTRAVENOUS AS NEEDED
Status: DISCONTINUED | OUTPATIENT
Start: 2021-03-03 | End: 2021-03-03 | Stop reason: HOSPADM

## 2021-03-03 RX ORDER — DIPHENHYDRAMINE HYDROCHLORIDE 50 MG/ML
12.5 INJECTION, SOLUTION INTRAMUSCULAR; INTRAVENOUS AS NEEDED
Status: DISCONTINUED | OUTPATIENT
Start: 2021-03-03 | End: 2021-03-03 | Stop reason: HOSPADM

## 2021-03-03 RX ORDER — HYDROMORPHONE HYDROCHLORIDE 2 MG/ML
INJECTION, SOLUTION INTRAMUSCULAR; INTRAVENOUS; SUBCUTANEOUS AS NEEDED
Status: DISCONTINUED | OUTPATIENT
Start: 2021-03-03 | End: 2021-03-03 | Stop reason: HOSPADM

## 2021-03-03 RX ORDER — METOPROLOL TARTRATE 5 MG/5ML
INJECTION INTRAVENOUS AS NEEDED
Status: DISCONTINUED | OUTPATIENT
Start: 2021-03-03 | End: 2021-03-03 | Stop reason: HOSPADM

## 2021-03-03 RX ORDER — PROPOFOL 10 MG/ML
INJECTION, EMULSION INTRAVENOUS AS NEEDED
Status: DISCONTINUED | OUTPATIENT
Start: 2021-03-03 | End: 2021-03-03 | Stop reason: HOSPADM

## 2021-03-03 RX ORDER — SODIUM CHLORIDE, SODIUM LACTATE, POTASSIUM CHLORIDE, CALCIUM CHLORIDE 600; 310; 30; 20 MG/100ML; MG/100ML; MG/100ML; MG/100ML
125 INJECTION, SOLUTION INTRAVENOUS CONTINUOUS
Status: DISCONTINUED | OUTPATIENT
Start: 2021-03-03 | End: 2021-03-03 | Stop reason: HOSPADM

## 2021-03-03 RX ORDER — FLUMAZENIL 0.1 MG/ML
0.2 INJECTION INTRAVENOUS
Status: DISCONTINUED | OUTPATIENT
Start: 2021-03-03 | End: 2021-03-03 | Stop reason: HOSPADM

## 2021-03-03 RX ORDER — DEXAMETHASONE SODIUM PHOSPHATE 10 MG/ML
10 INJECTION INTRAMUSCULAR; INTRAVENOUS
Status: COMPLETED | OUTPATIENT
Start: 2021-03-03 | End: 2021-03-03

## 2021-03-03 RX ORDER — OXYMETAZOLINE HCL 0.05 %
2 SPRAY, NON-AEROSOL (ML) NASAL
Status: DISCONTINUED | OUTPATIENT
Start: 2021-03-03 | End: 2021-03-03 | Stop reason: HOSPADM

## 2021-03-03 RX ORDER — OXYMETAZOLINE HCL 0.05 %
SPRAY, NON-AEROSOL (ML) NASAL AS NEEDED
Status: DISCONTINUED | OUTPATIENT
Start: 2021-03-03 | End: 2021-03-03 | Stop reason: HOSPADM

## 2021-03-03 RX ORDER — NALOXONE HYDROCHLORIDE 0.4 MG/ML
0.2 INJECTION, SOLUTION INTRAMUSCULAR; INTRAVENOUS; SUBCUTANEOUS
Status: DISCONTINUED | OUTPATIENT
Start: 2021-03-03 | End: 2021-03-03 | Stop reason: HOSPADM

## 2021-03-03 RX ORDER — ONDANSETRON 2 MG/ML
INJECTION INTRAMUSCULAR; INTRAVENOUS AS NEEDED
Status: DISCONTINUED | OUTPATIENT
Start: 2021-03-03 | End: 2021-03-03 | Stop reason: HOSPADM

## 2021-03-03 RX ORDER — SUCCINYLCHOLINE CHLORIDE 20 MG/ML
INJECTION INTRAMUSCULAR; INTRAVENOUS AS NEEDED
Status: DISCONTINUED | OUTPATIENT
Start: 2021-03-03 | End: 2021-03-03 | Stop reason: HOSPADM

## 2021-03-03 RX ORDER — HYDROMORPHONE HYDROCHLORIDE 1 MG/ML
.25-1 INJECTION, SOLUTION INTRAMUSCULAR; INTRAVENOUS; SUBCUTANEOUS
Status: DISCONTINUED | OUTPATIENT
Start: 2021-03-03 | End: 2021-03-03 | Stop reason: HOSPADM

## 2021-03-03 RX ORDER — MUPIROCIN 20 MG/G
OINTMENT TOPICAL
Qty: 22 G | Refills: 0 | Status: SHIPPED | OUTPATIENT
Start: 2021-03-03

## 2021-03-03 RX ORDER — MIDAZOLAM HYDROCHLORIDE 1 MG/ML
INJECTION, SOLUTION INTRAMUSCULAR; INTRAVENOUS AS NEEDED
Status: DISCONTINUED | OUTPATIENT
Start: 2021-03-03 | End: 2021-03-03 | Stop reason: HOSPADM

## 2021-03-03 RX ORDER — BACITRACIN ZINC 500 UNIT/G
OINTMENT (GRAM) TOPICAL AS NEEDED
Status: DISCONTINUED | OUTPATIENT
Start: 2021-03-03 | End: 2021-03-03 | Stop reason: HOSPADM

## 2021-03-03 RX ORDER — FENTANYL CITRATE 50 UG/ML
INJECTION, SOLUTION INTRAMUSCULAR; INTRAVENOUS AS NEEDED
Status: DISCONTINUED | OUTPATIENT
Start: 2021-03-03 | End: 2021-03-03 | Stop reason: HOSPADM

## 2021-03-03 RX ADMIN — PROPOFOL 50 MG: 10 INJECTION, EMULSION INTRAVENOUS at 11:24

## 2021-03-03 RX ADMIN — MIDAZOLAM HYDROCHLORIDE 3 MG: 2 INJECTION, SOLUTION INTRAMUSCULAR; INTRAVENOUS at 10:55

## 2021-03-03 RX ADMIN — ROCURONIUM BROMIDE 10 MG: 10 INJECTION INTRAVENOUS at 10:59

## 2021-03-03 RX ADMIN — SODIUM CHLORIDE, POTASSIUM CHLORIDE, SODIUM LACTATE AND CALCIUM CHLORIDE 125 ML/HR: 600; 310; 30; 20 INJECTION, SOLUTION INTRAVENOUS at 10:28

## 2021-03-03 RX ADMIN — FENTANYL CITRATE 100 MCG: 50 INJECTION, SOLUTION INTRAMUSCULAR; INTRAVENOUS at 11:01

## 2021-03-03 RX ADMIN — SODIUM CHLORIDE, POTASSIUM CHLORIDE, SODIUM LACTATE AND CALCIUM CHLORIDE: 600; 310; 30; 20 INJECTION, SOLUTION INTRAVENOUS at 10:21

## 2021-03-03 RX ADMIN — PROPOFOL 50 MCG/KG/MIN: 10 INJECTION, EMULSION INTRAVENOUS at 11:26

## 2021-03-03 RX ADMIN — Medication 2 SPRAY: at 10:35

## 2021-03-03 RX ADMIN — METOPROLOL TARTRATE 1 MG: 5 INJECTION, SOLUTION INTRAVENOUS at 11:12

## 2021-03-03 RX ADMIN — HYDROMORPHONE HYDROCHLORIDE 1 MG: 2 INJECTION INTRAMUSCULAR; INTRAVENOUS; SUBCUTANEOUS at 12:02

## 2021-03-03 RX ADMIN — DEXAMETHASONE SODIUM PHOSPHATE 10 MG: 10 INJECTION INTRAMUSCULAR; INTRAVENOUS at 11:18

## 2021-03-03 RX ADMIN — CEFAZOLIN SODIUM 3 G: 1 POWDER, FOR SOLUTION INTRAMUSCULAR; INTRAVENOUS at 11:08

## 2021-03-03 RX ADMIN — Medication 10 MG: at 12:15

## 2021-03-03 RX ADMIN — Medication 2 SPRAY: at 10:27

## 2021-03-03 RX ADMIN — FENTANYL CITRATE 150 MCG: 50 INJECTION, SOLUTION INTRAMUSCULAR; INTRAVENOUS at 10:59

## 2021-03-03 RX ADMIN — Medication 2 SPRAY: at 10:44

## 2021-03-03 RX ADMIN — PROPOFOL 200 MG: 10 INJECTION, EMULSION INTRAVENOUS at 10:59

## 2021-03-03 RX ADMIN — ONDANSETRON HYDROCHLORIDE 4 MG: 2 SOLUTION INTRAMUSCULAR; INTRAVENOUS at 12:24

## 2021-03-03 RX ADMIN — Medication 10 MG: at 11:39

## 2021-03-03 RX ADMIN — SUCCINYLCHOLINE CHLORIDE 100 MG: 20 INJECTION, SOLUTION INTRAMUSCULAR; INTRAVENOUS at 10:59

## 2021-03-03 RX ADMIN — ROCURONIUM BROMIDE 20 MG: 10 INJECTION INTRAVENOUS at 11:04

## 2021-03-03 NOTE — ANESTHESIA POSTPROCEDURE EVALUATION
Procedure(s):  EXCISION RHINOPHYMA.    general    Anesthesia Post Evaluation      Multimodal analgesia: multimodal analgesia used between 6 hours prior to anesthesia start to PACU discharge  Patient location during evaluation: bedside  Patient participation: complete - patient participated  Level of consciousness: awake  Pain management: adequate  Airway patency: patent  Anesthetic complications: no  Cardiovascular status: acceptable  Respiratory status: acceptable  Hydration status: acceptable        INITIAL Post-op Vital signs:   Vitals Value Taken Time   /92 03/03/21 1320   Temp 36.5 °C (97.7 °F) 03/03/21 1250   Pulse 72 03/03/21 1323   Resp 13 03/03/21 1323   SpO2 99 % 03/03/21 1323   Vitals shown include unvalidated device data. Patient will take home meds when he gets home today. BP back to baseline at admission.

## 2021-03-03 NOTE — ANESTHESIA PREPROCEDURE EVALUATION
Relevant Problems   No relevant active problems       Anesthetic History               Review of Systems / Medical History  Patient summary reviewed and pertinent labs reviewed    Pulmonary        Sleep apnea: CPAP           Neuro/Psych         TIA (short term memory loss)     Cardiovascular    Hypertension        Dysrhythmias (s/p ablation) : atrial fibrillation  Hyperlipidemia    Exercise tolerance: >4 METS  Comments: Off xarelto x2 days   GI/Hepatic/Renal     GERD: well controlled           Endo/Other        Morbid obesity     Other Findings              Physical Exam    Airway  Mallampati: II  TM Distance: 4 - 6 cm  Neck ROM: normal range of motion   Mouth opening: Normal     Cardiovascular    Rhythm: regular  Rate: normal         Dental    Dentition: Upper dentition intact, Lower dentition intact and Caps/crowns     Pulmonary  Breath sounds clear to auscultation               Abdominal         Other Findings            Anesthetic Plan    ASA: 3  Anesthesia type: general          Induction: Intravenous  Anesthetic plan and risks discussed with: Patient

## 2021-03-03 NOTE — DISCHARGE INSTRUCTIONS
Patient Discharge Instructions    Elva Mccauley / [id number] : 1950    Admitted 3/3/2021 Discharged: 3/3/2021             Surgery Post-Operative Instructions      Have someone help you at home for 1-2 days.  Get plenty of rest.   Follow a balanced diet.  Take pain medication as prescribed.  Do not take aspirin (or products that contain aspirin) unless approved by your surgeon.  Do not drink alcohol while taking pain medication.  DO NOT SMOKE. Smoking decreases blood flow. It can delay wound healing or cause tissue loss. Activities   Start walking as soon as possible, this helps to reduce swelling and lowers the chance of blood clots.  Do not drive until you are no longer taking pain medication (narcotics).  You may tire easily. Plan on taking it easy for the first week.  No strenuous activity, including sex and heavy housework for 2 weeks post op.  Avoid bending over.  Keep your head elevated. Incision Care    Primary Closure   Remove dressing in 48 hours. There will be a large raw area on your nose. Apply antibiotic ointment (mupirocin) 3-4x/day. Avoid letting crusting form.  You may shower after 48 hours but keep the nose dry. Ointment will help repel water from the nose. What To Expect   Maximum discomfort should occur in the first few days, improving each day thereafter.  Bruising, swelling, numbness, tightness and tenderness of skin for 10 - 14 days.  The face may look and feel strange and be distorted from the swelling.  Sensation may be decreased. This may or may not fully return. When to Call   If you have increased swelling or bruising.  If you have increased redness along the incision.  If you have increased pain that is not relieved by your pain medication.     If you have any side effect from your medication; rash, nausea, vomiting, diarrhea, etc.    If you have a temperature greater than 101.0F    If you have yellow or green drainage from the incisions or notice a foul odor.  If you have bleeding from the incisions that is difficult to control with light pressure. Follow-up with Smita Bass MD on Wednesday 3/10/21 at 8:00am    If you have any questions, please call us at [phone]. We are always happy to answer your questions, and if you should have a problem, this number is answered 24 hours a day. DISCHARGE SUMMARY from your Nurse      PATIENT INSTRUCTIONS    After general anesthesia or intravenous sedation, for 24 hours or while taking prescription Narcotics:  · Limit your activities  · Do not drive and operate hazardous machinery  · Do not make important personal or business decisions  · Do  not drink alcoholic beverages  · If you have not urinated within 8 hours after discharge, please contact your surgeon on call. Report the following to your surgeon:  · Excessive pain, swelling, redness or odor of or around the surgical area  · Temperature over 100.5  · Nausea and vomiting lasting longer than 4 hours or if unable to take medications  · Any signs of decreased circulation or nerve impairment to extremity: change in color, persistent  numbness, tingling, coldness or increase pain  · Any questions      GOOD HELP TO FIGHT AN INFECTION  Here are a few tip to help reduce the chance of getting an infection after surgery:   Wash Your Hands   Good handwashing is the most important thing you and your caregiver can do.  Wash before and after caring for any wounds. Dry your hand with a clean towel.  Wash with soap and water for at least 20 seconds. A TIP: sing the \"Happy Birthday\" song through one time while washing to help with the timing.  Use a hand  in between washings.  Shower   When your surgeon says it is OK to take a shower, use a new bar of antibacterial soap (if that is what you use, and keep that bar of soap ONLY for your use), or antibacterial body wash.    Use a clean wash cloth or sponge when you bathe.  Dry off with a clean towel  after every bath - be careful around any wounds, skin staples, sutures or surgical glue over/on wounds.  Do not enter swimming pools, hot tubs, lakes, rivers and/or ocean until wounds are healed and your doctor/surgeon says it is OK.  Use Clean Sheets   Sleep on freshly laundered sheets after your surgery.  Keep the surgery site covered with a clean, dry bandage (if instructed to do so). If the bandage becomes soiled, reapply a new, dry, clean bandage.  Do not allow pets to sleep with you while your wound is healing.  Lifestyle Modification and Controlling Your Blood Sugar   Smoking slows wound healing. Stop smoking and limit exposure to second-hand smoke.  High blood sugar slows wound healing. Eat a well-balanced diet to provide proper nutrition while healing   Monitor your blood sugar (if you are a diabetic) and take your medications as you are suppose to so you can control you blood sugar after surgery. COUGH AND DEEP BREATHE    Breathing deeply and coughing are very important exercises to do after surgery. Deep breathing and coughing open the little air tubes and air sacks in your lungs. You take deep breaths every day. You may not even notice - it is just something you do when you sigh or yawn. It is a natural exercise you do to keep these air passages open. After surgery, take deep breaths and cough, on purpose. DIRECTIONS:  · Take 10 to 15 slow deep breaths every hour while awake. · Breathe in deeply, and hold it for 2 seconds. · Exhale slowly through puckered lips, like blowing up a balloon. · After every 4th or 5th deep breath, hug your pillow to your chest or belly and give a hard, deep cough. Yes, it will probably hurt. But doing this exercise is a very important part of healing after surgery. Take your pain medicine to help you do this exercise without too much pain.     Coughing and deep breathing help prevent bronchitis and pneumonia after surgery. If you had chest or belly surgery, use a pillow as a \"hug gurdeep\" and hold it tightly to your chest or belly when you cough. ANKLE PUMPS    Ankle pumps increase the circulation of oxygenated blood to your lower extremities and decrease your risk for circulation problems such as blood clots. They also stretch the muscles, tendons and ligaments in your foot and ankle, and prevent joint contracture in the ankle and foot, especially after surgeries on the legs. It is important to do ankle pump exercises regularly after surgery because immobility increases your risk for developing a blood clot. Your doctor may also have you take an Aspirin for the next few days as well. If your doctor did not ask you to take an Aspirin, consult with him before starting Aspirin therapy on your own. The exercise is quite simple. · Slowly point your foot forward, feeling the muscles on the top of your lower leg stretch, and hold this position for 5 seconds. · Next, pull your foot back toward you as far as possible, stretching the calf muscles, and hold that position for 5 seconds. · Repeat with the other foot. · Perform 10 repetitions every hour while awake for both ankles if possible (down and then up with the foot once is one repetition). You should feel gentle stretching of the muscles in your lower leg when doing this exercise. If you feel pain, or your range of motion is limited, don't push too hard. Only go the limit your joint and muscles will let you go. If you have increasing pain, progressively worsening leg warmth or swelling, STOP the exercise and call your doctor. MEDICATION AND   SIDE EFFECT GUIDE    The [facility] MEDICATION AND SIDE EFFECT GUIDE was provided to the PATIENT AND CARE PROVIDER.   Information provided includes instruction about drug purpose and common side effects for the following medications:   · omnicef  · norco  · mupirocin        These are general instructions for a healthy lifestyle:    *   Please give a list of your current medications to your Primary Care Provider. *   Please update this list whenever your medications are discontinued, doses are changed, or new medications (including over-the-counter products) are added. *   Please carry medication information at all times in case of emergency situations. About Smoking  No smoking / No tobacco products  Avoid exposure to second hand smoke     Surgeon General's Warning:  Quitting smoking now greatly reduces serious risk to your health. Obesity, smoking, and sedentary lifestyle greatly increases your risk for illness and disease. A healthy diet, regular physical exercise & weight monitoring are important for maintaining a healthy lifestyle. Congestive Heart Failure  You may be retaining fluid if you have a history of heart failure or if you experience any of the following symptoms:  Weight gain of 3 pounds or more overnight or 5 pounds in a week, increased swelling in your hands or feet or shortness of breath while lying flat in bed. Please call your doctor as soon as you notice any of these symptoms; do not wait until your next office visit. Recognize signs and symptoms of STROKE:  F -  Face looks uneven  A -  Arms unable to move or move evenly  S -  Speech slurred or non-existent  T -  Time-call 911 as soon as signs and symptoms begin-DO NOT go          back to bed or wait to see if you get better-TIME IS BRAIN. Warning Signs of HEART ATTACK   Call 911 if you have these symptoms:     Chest discomfort. Most heart attacks involve discomfort in the center of the chest that lasts more than a few minutes, or that goes away and comes back. It can feel like uncomfortable pressure, squeezing, fullness, or pain.  Discomfort in other areas of the upper body.  Symptoms can include pain or discomfort in one or both arms, the back, neck, jaw, or stomach.  Shortness of breath with or without chest discomfort.  Other signs may include breaking out in a cold sweat, nausea, or lightheadedness. Don't wait more than five minutes to call 911 - MINUTES MATTER! Fast action can save your life. Calling 911 is almost always the fastest way to get lifesaving treatment. Emergency Medical Services staff can begin treatment when they arrive -- up to an hour sooner than if someone gets to the hospital by car. Learning About Coronavirus [phone])  Coronavirus [phone]): Overview  What is coronavirus (COVID-19)? The coronavirus disease (COVID-19) is caused by a virus. It is an illness that was first found in [place], [place], in December 2019. It has since spread worldwide. The virus can cause fever, cough, and trouble breathing. In severe cases, it can cause pneumonia and make it hard to breathe without help. It can cause death. Coronaviruses are a large group of viruses. They cause the common cold. They also cause more serious illnesses like Middle East respiratory syndrome (MERS) and severe acute respiratory syndrome (SARS). COVID-19 is caused by a novel coronavirus. That means it's a new type that has not been seen in people before. This virus spreads person-to-person through droplets from coughing and sneezing. It can also spread when you are close to someone who is infected. And it can spread when you touch something that has the virus on it, such as a doorknob or a tabletop. What can you do to protect yourself from coronavirus (COVID-19)? The best way to protect yourself from getting sick is to:  · Avoid areas where there is an outbreak. · Avoid contact with people who may be infected. · Wash your hands often with soap or alcohol-based hand sanitizers. · Avoid crowds and try to stay at least 6 feet away from other people. · Wash your hands often, especially after you cough or sneeze.  Use soap and water, and scrub for at least 20 seconds. If soap and water aren't available, use an alcohol-based hand . · Avoid touching your mouth, nose, and eyes. What can you do to avoid spreading the virus to others? To help avoid spreading the virus to others:  · Cover your mouth with a tissue when you cough or sneeze. Then throw the tissue in the trash. · Use a disinfectant to clean things that you touch often. · Stay home if you are sick or have been exposed to the virus. Don't go to school, work, or public areas. And don't use public transportation. · If you are sick:  ? Leave your home only if you need to get medical care. But call the doctor's office first so they know you're coming. And wear a face mask, if you have one.  ? If you have a face mask, wear it whenever you're around other people. It can help stop the spread of the virus when you cough or sneeze. ? Clean and disinfect your home every day. Use household  and disinfectant wipes or sprays. Take special care to clean things that you grab with your hands. These include doorknobs, remote controls, phones, and handles on your refrigerator and microwave. And don't forget countertops, tabletops, bathrooms, and computer keyboards. When to call for help  Call 911 anytime you think you may need emergency care. For example, call if:  · You have severe trouble breathing. (You can't talk at all.)  · You have constant chest pain or pressure. · You are severely dizzy or lightheaded. · You are confused or can't think clearly. · Your face and lips have a blue color. · You pass out (lose consciousness) or are very hard to wake up. Call your doctor now if you develop symptoms such as:  · Shortness of breath. · Fever. · Cough. If you need to get care, call ahead to the doctor's office for instructions before you go. Make sure you wear a face mask, if you have one, to prevent exposing other people to the virus. Where can you get the latest information?   The following health organizations are tracking and studying this virus. Their websites contain the most up-to-date information. Alicia Rivera also learn what to do if you think you may have been exposed to the virus. · U.S. Centers for Disease Control and Prevention (CDC): The CDC provides updated news about the disease and travel advice. The website also tells you how to prevent the spread of infection. www.cdc.gov  · World Health Organization [facility]): WHO offers information about the virus outbreaks. WHO also has travel advice. www.who.int  Current as of: April 1, 2020               Content Version: 12.4  © 1555-0779 [employer]. Care instructions adapted under license by your healthcare professional. If you have questions about a medical condition or this instruction, always ask your healthcare professional. [address] any warranty or liability for your use of this information. The discharge information has been reviewed with the {PATIENT PARENT GUARDIAN:47118}. Any questions and concerns from the {PATIENT PARENT GUARDIAN:89914} have been addressed. The {PATIENT PARENT GUARDIAN:75291} verbalized understanding. The following personal items collected during your admission are returned to you:   Dental Appliance: Dental Appliances: None  Vision: Visual Aid: Glasses, At home  Hearing Aid:    Jewelry: Jewelry: None  Clothing: Clothing: Other (comment)(street clothes to locker)  Other Valuables:  Other Valuables: None  Valuables sent to safe:

## 2021-03-04 NOTE — OP NOTES
[facility] 81  OPERATIVE REPORT    Name:  Tanisha Washington  MR#:  [id number]  :  1950  ACCOUNT #:  [de-identified]  DATE OF SERVICE:  2021    PREOPERATIVE DIAGNOSIS:  Rhinophyma. POSTOPERATIVE DIAGNOSIS:  Rhinophyma. PROCEDURE PERFORMED:  Excision of rhinophyma. SURGEON:  Julio Watson MD    ASSISTANT:  NONE    ANESTHESIA:  General.    COMPLICATIONS:  None. SPECIMENS REMOVED:  Rhinophyma tissue. IMPLANTS:  NONE    ESTIMATED BLOOD LOSS:  50 mL. FINDINGS:  There were moderate rhinophyma changes to the nose, particularly to the left alar lobule and the right alar lobule. The tip was generally thickened with sebaceous skin but without the gross deformity typically associated with rhinophyma in some cases. There was some collapse of the left nostril which was likely due to the heaviness of the tissue. This actually improved on the table after reduction of the rhinophyma. There was significantly weak tip support inherently in addition to the rhinophyma issues. INDICATIONS FOR THE PROCEDURE:  The patient is a 71-year-old male who has noted slow enlargement of sebaceous rhinophyma-type tissue on his nose. This has started to cause nasal obstruction on the left side which has not improved with medical management. The patient notes resolution of the nasal obstruction if he splints open the nostril. PROCEDURE:  After informed consent, the patient was taken to the operating room and placed on the table in supine position. After general anesthesia, the table was turned 180 degrees. The nose was injected with a tumescent mixture which was 1000 mL of saline with three ampules of 1:1000 epinephrine and one ampule of tranexamic acid. No plain lidocaine was placed into this because the patient had a POSSIBLE LIDOCAINE ALLERGY. The nose was infiltrated with about 10 mL total of this mixture. The patient was then prepped and draped in standard fashion. Pledgets were placed into the nostrils to prevent any bleeding into the nasal cavity. Initially, a #10-blade was used to shave down the rhinophyma, first on the left alar lobule after conservative shaving and a 4-mm suction debrider device was used to take down very small amounts at a time in a very controlled fashion to smooth out the area. The rim was frequently palpated to  the thickness and the excision was stopped when the base of the deep pores of the nose was noted. There was a fairly deep crease between the alar lobule and the tip on the left and this was also used as a gauge for the depth of excision. The right alar lobule was then done in the same fashion which was less prominent but still quite thick on the right side. The tip was then shaved down again with the same methods with a 10-blade and then the 4-mm suction debrider device. This was done fairly conservatively again as this was more thickened tissue that was not quite as typical of rhinophyma lobular-type tissue. After conservative reduction then, a Bovie scratch pad which was wrapped around a 3-mL syringe was used for minor dermabrasion on the edge just to blend into the remaining skin. The rhinophyma was confined mostly to the tip and supratip and so the dorsal skin was left intact. After completing the reduction of tissue, there was a moderate amount of oozing which was stopped with epinephrine-soaked gauze. Then minor oozing was able to be cauterized with pinpoint low-power Bovie cautery. The shape was dramatically improved and the nostril openings were also significantly improved after just excision of the rhinophyma. The area was cleaned and then bacitracin, Telfa, and tape were placed as a dressing. The pledgets were removed from the nose and this concluded the procedure. The patient was awakened from anesthesia, extubated, and taken to the PACU in stable condition. There were no complications.   The patient tolerated the procedure well.       MD MILTON [email or web address]/[email or web address]/  D:  03/03/2021 15:13  T:  03/03/2021 20:28  JOB #:  [id number]

## 2023-05-10 RX ORDER — ATENOLOL 25 MG/1
1 TABLET ORAL DAILY
COMMUNITY
Start: 2020-07-06

## 2023-05-10 RX ORDER — AMLODIPINE BESYLATE 2.5 MG/1
2.5 TABLET ORAL DAILY
COMMUNITY

## 2023-05-10 RX ORDER — SIMVASTATIN 40 MG
TABLET ORAL
COMMUNITY
Start: 2020-08-10

## 2023-05-10 RX ORDER — SPIRONOLACTONE 25 MG/1
25 TABLET ORAL DAILY
COMMUNITY
Start: 2020-09-10

## 2023-05-10 RX ORDER — DOFETILIDE 0.25 MG/1
1 CAPSULE ORAL 2 TIMES DAILY
COMMUNITY
Start: 2020-08-01

## 2023-05-10 RX ORDER — DICLOFENAC SODIUM 75 MG/1
TABLET, DELAYED RELEASE ORAL
COMMUNITY
Start: 2020-08-08

## (undated) DEVICE — ELECTRODE NDL L2CM TUNGSTEN MICSURG STR IMPROVED CUT AND

## (undated) DEVICE — STRAP,POSITIONING,KNEE/BODY,FOAM,4X60": Brand: MEDLINE

## (undated) DEVICE — PACK PROCEDURE SURG ENT CUST

## (undated) DEVICE — TAPE ADH W0.5INXL10YD WHT PAPR GENTLE BRTH FLX COMFORTABLE

## (undated) DEVICE — ROCKER SWITCH PENCIL BLADE ELECTRODE, HOLSTER: Brand: EDGE

## (undated) DEVICE — PAD,NON-ADHERENT,3X8,STERILE,LF,1/PK: Brand: MEDLINE

## (undated) DEVICE — STERILE POLYISOPRENE POWDER-FREE SURGICAL GLOVES: Brand: PROTEXIS

## (undated) DEVICE — MASTISOL ADHESIVE LIQ 2/3ML

## (undated) DEVICE — BIPOLAR FORCEPS CORD: Brand: VALLEYLAB

## (undated) DEVICE — SOL IRRIGATION INJ NACL 0.9% 500ML BTL

## (undated) DEVICE — BLADE 1882040 5PK INFERIOR TURB 2MM

## (undated) DEVICE — Z INACTIVE USE 2735373 APPLICATOR FBR LAIN COT WOOD TIP ECONOMICAL

## (undated) DEVICE — MAGNETIC INSTRUMENT PAD 10" X 16"; MEDIUM; DISPOSABLE: Brand: CARDINAL HEALTH

## (undated) DEVICE — SOLUTION SCRB 2OZ 10% POVIDONE IOD ANTIMIC BTL

## (undated) DEVICE — INFECTION CONTROL KIT SYS